# Patient Record
Sex: MALE | Race: BLACK OR AFRICAN AMERICAN | Employment: FULL TIME | URBAN - METROPOLITAN AREA
[De-identification: names, ages, dates, MRNs, and addresses within clinical notes are randomized per-mention and may not be internally consistent; named-entity substitution may affect disease eponyms.]

---

## 2021-03-10 ENCOUNTER — HOSPITAL ENCOUNTER (EMERGENCY)
Age: 46
Discharge: HOME | End: 2021-03-10
Attending: EMERGENCY MEDICINE
Payer: COMMERCIAL

## 2021-03-10 VITALS
TEMPERATURE: 98 F | SYSTOLIC BLOOD PRESSURE: 123 MMHG | WEIGHT: 185 LBS | DIASTOLIC BLOOD PRESSURE: 80 MMHG | RESPIRATION RATE: 16 BRPM | OXYGEN SATURATION: 100 % | BODY MASS INDEX: 25.9 KG/M2 | HEART RATE: 65 BPM | HEIGHT: 71 IN

## 2021-03-10 DIAGNOSIS — S50.859A: Primary | ICD-10-CM

## 2021-03-10 PROCEDURE — 90471 IMMUNIZATION ADMIN: CPT

## 2021-03-10 PROCEDURE — 90715 TDAP VACCINE 7 YRS/> IM: CPT | Performed by: EMERGENCY MEDICINE

## 2021-03-10 PROCEDURE — 99282 EMERGENCY DEPT VISIT SF MDM: CPT

## 2021-03-10 PROCEDURE — 75810000121 HC INCSN/RMVL FB ANY OTHER SITE

## 2021-03-10 PROCEDURE — 74011250636 HC RX REV CODE- 250/636: Performed by: EMERGENCY MEDICINE

## 2021-03-10 RX ORDER — LIDOCAINE HYDROCHLORIDE 10 MG/ML
10 INJECTION INFILTRATION; PERINEURAL ONCE
Status: DISCONTINUED | OUTPATIENT
Start: 2021-03-10 | End: 2021-03-10 | Stop reason: HOSPADM

## 2021-03-10 RX ADMIN — TETANUS TOXOID, REDUCED DIPHTHERIA TOXOID AND ACELLULAR PERTUSSIS VACCINE, ADSORBED 0.5 ML: 5; 2.5; 8; 8; 2.5 SUSPENSION INTRAMUSCULAR at 08:09

## 2021-03-10 NOTE — ED PROVIDER NOTES
EMERGENCY DEPARTMENT HISTORY AND PHYSICAL EXAM      Date: 3/10/2021  Patient Name: Mayo Newsome    History of Presenting Illness     Chief Complaint   Patient presents with    Foreign Body       History Provided By: Patient    HPI: Mayo Newsome, 39 y.o. male with a past medical history significant No significant past medical history presents to the ED with chief complaint of Foreign Body  . 72-year-old who is otherwise healthy. 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 They are able to pull the large piece out but a small piece broke and he thinks he still has a foreign body still in his arm. No concern of any metal.  No deformity. No pain medicine prior to arrival.  Occurred just prior to coming. Unsure about his tetanus immunizations. Isolated injury to the arm. It is a Workmen's Comp. There are no other complaints, changes, or physical findings at this time. PCP: None    Current Facility-Administered Medications   Medication Dose Route Frequency Provider Last Rate Last Admin    lidocaine (XYLOCAINE) 10 mg/mL (1 %) injection 10 mL  10 mL IntraDERMal ONCE Annabel Fernandez MD           Past History     Past Medical History:  Denies      Past Surgical History:  Denies      Family History:  Denies      Social History:[place]  Social History     Tobacco Use    Smoking status: Not on file   Substance Use Topics    Alcohol use: Not on file    Drug use: Not on file       Allergies:  No Known Allergies      Review of Systems   Review of Systems   Constitutional: Negative. Negative for chills, fatigue and fever. HENT: Negative. Negative for congestion, ear pain, nosebleeds and sore throat. Eyes: Negative. Negative for pain, discharge and visual disturbance. Respiratory: Negative. Negative for cough, chest tightness and shortness of breath. Cardiovascular: Negative. Negative for chest pain and leg swelling. Gastrointestinal: Negative. Negative for abdominal pain, blood in stool, constipation, diarrhea, nausea and vomiting. Endocrine: Negative. Genitourinary: Negative. Negative for difficulty urinating, dysuria and flank pain. Musculoskeletal: Negative. Negative for back pain and myalgias. Skin: Positive for wound. Negative for rash. Allergic/Immunologic: Negative. Neurological: Negative. Negative for dizziness, syncope, weakness, numbness and headaches. Hematological: Negative. Does not bruise/bleed easily. Psychiatric/Behavioral: Negative. Negative for agitation, confusion, hallucinations and suicidal ideas. All other systems reviewed and are negative. Physical Exam   Physical Exam  Vitals signs and nursing note reviewed. Constitutional:       General: He is not in acute distress. Appearance: He is normal weight. He is not ill-appearing. HENT:      Head: Normocephalic and atraumatic. Right Ear: External ear normal.      Left Ear: External ear normal.      Nose: Nose normal. No rhinorrhea. Mouth/Throat:      Mouth: Mucous membranes are moist.      Pharynx: Oropharynx is clear. Eyes:      Extraocular Movements: Extraocular movements intact. Conjunctiva/sclera: Conjunctivae normal.      Pupils: Pupils are equal, round, and reactive to light. Neck:      Musculoskeletal: Normal range of motion and neck supple. Cardiovascular:      Rate and Rhythm: Normal rate and regular rhythm. Pulses: Normal pulses. Heart sounds: Normal heart sounds. Pulmonary:      Effort: Pulmonary effort is normal. No respiratory distress. Breath sounds: Normal breath sounds. Abdominal:      General: Abdomen is flat. Bowel sounds are normal.      Palpations: Abdomen is soft. Musculoskeletal: Normal range of motion. General: No tenderness or deformity. Skin:     General: Skin is warm and dry. Capillary Refill: Capillary refill takes less than 2 seconds. Findings: No bruising, lesion or rash. Comments: Forearm wound that is not actively bleeding. Just adjacent to the wound there is a very firm area concern for a possible foreign body. Neurological:      General: No focal deficit present. Mental Status: He is alert and oriented to person, place, and time. Mental status is at baseline. Psychiatric:         Mood and Affect: Mood normal.         Behavior: Behavior normal.         Thought Content: Thought content normal.         Judgment: Judgment normal.         Diagnostic Study Results     Labs -   No results found for this or any previous visit (from the past 12 hour(s)). Radiologic Studies -   No orders to display     CT Results  (Last 48 hours)    None        CXR Results  (Last 48 hours)    None          Medical Decision Making and ED Course   I am the first provider for this patient. I reviewed the vital signs, available nursing notes, past medical history, past surgical history, family history and social history. Vital Signs-Reviewed the patient's vital signs. Patient Vitals for the past 12 hrs:   Temp Pulse Resp BP SpO2   03/10/21 0753 98 °F (36.7 °C) 65 16 123/80 100 %       EKG interpretation:         Records Reviewed: Previous Hospital chart. EMS run report      ED Course:   Initial assessment performed. The patients presenting problems have been discussed, and they are in agreement with the care plan formulated and outlined with them. I have encouraged them to ask questions as they arise throughout their visit. Orders Placed This Encounter    lidocaine (XYLOCAINE) 10 mg/mL (1 %) injection 10 mL    diph,Pertuss(AC),Tet Vac-PF (BOOSTRIX) suspension 0.5 mL              CONSULTANTS:  Consults      Provider Notes (Medical Decision Making):   33-year-old with a wood foreign body in his forearm. Tetanus updated. I&D performed and foreign body removed patient feels better. Workmen's Comp. for follow-up.       Procedures FOREIGN BODY REMOVAL:  Lidocaine 1% 2cc, foreign body removed, wood splinter, pt tolerated well. abx prescribed                Disposition       Emergency Department Disposition:  Discharged      Diagnosis     Clinical Impression:   1. Foreign body in forearm, unspecified laterality, initial encounter        Attestations:    Ericka Rayo MD    Please note that this dictation was completed with [employer], the computer voice recognition software. Quite often unanticipated grammatical, syntax, homophones, and other interpretive errors are inadvertently transcribed by the computer software. Please disregard these errors. Please excuse any errors that have escaped final proofreading. Thank you.

## 2021-03-10 NOTE — ED TRIAGE NOTES
[employer] pt stated that while at work a piece of a wood splinter flew into his right lower arm; bleeding under control

## 2021-03-10 NOTE — LETTER
Billie Adair was seen and treated in our emergency department on 3/10/2021. He may return to work on 3/12/2021 If you have any questions or concerns, please don't hesitate to call.  
 
 
Anaid Briones PA-C

## 2021-11-23 ENCOUNTER — HOSPITAL ENCOUNTER (EMERGENCY)
Age: 46
Discharge: HOME | End: 2021-11-23
Admitting: EMERGENCY MEDICINE
Payer: OTHER MISCELLANEOUS

## 2021-11-23 ENCOUNTER — APPOINTMENT (OUTPATIENT)
Dept: GENERAL RADIOLOGY | Age: 46
End: 2021-11-23
Attending: EMERGENCY MEDICINE
Payer: OTHER MISCELLANEOUS

## 2021-11-23 ENCOUNTER — APPOINTMENT (OUTPATIENT)
Dept: GENERAL RADIOLOGY | Age: 46
End: 2021-11-23
Attending: NURSE PRACTITIONER
Payer: OTHER MISCELLANEOUS

## 2021-11-23 VITALS
WEIGHT: 185 LBS | OXYGEN SATURATION: 99 % | BODY MASS INDEX: 25.9 KG/M2 | DIASTOLIC BLOOD PRESSURE: 79 MMHG | HEIGHT: 71 IN | TEMPERATURE: 98.4 F | RESPIRATION RATE: 18 BRPM | SYSTOLIC BLOOD PRESSURE: 121 MMHG | HEART RATE: 72 BPM

## 2021-11-23 DIAGNOSIS — S60.552A: Primary | ICD-10-CM

## 2021-11-23 PROCEDURE — 74011250636 HC RX REV CODE- 250/636: Performed by: NURSE PRACTITIONER

## 2021-11-23 PROCEDURE — 73130 X-RAY EXAM OF HAND: CPT

## 2021-11-23 PROCEDURE — 75810000121 HC INCSN/RMVL FB ANY OTHER SITE

## 2021-11-23 PROCEDURE — 99283 EMERGENCY DEPT VISIT LOW MDM: CPT

## 2021-11-23 PROCEDURE — 74011000250 HC RX REV CODE- 250: Performed by: NURSE PRACTITIONER

## 2021-11-23 PROCEDURE — 96375 TX/PRO/DX INJ NEW DRUG ADDON: CPT

## 2021-11-23 PROCEDURE — 96374 THER/PROPH/DIAG INJ IV PUSH: CPT

## 2021-11-23 RX ORDER — NAPROXEN 500 MG/1
500 TABLET ORAL 2 TIMES DAILY WITH MEALS
Qty: 30 TABLET | Refills: 0 | Status: SHIPPED | OUTPATIENT
Start: 2021-11-23

## 2021-11-23 RX ORDER — MORPHINE SULFATE 4 MG/ML
4 INJECTION INTRAVENOUS ONCE
Status: COMPLETED | OUTPATIENT
Start: 2021-11-23 | End: 2021-11-23

## 2021-11-23 RX ORDER — ONDANSETRON 2 MG/ML
4 INJECTION INTRAMUSCULAR; INTRAVENOUS
Status: COMPLETED | OUTPATIENT
Start: 2021-11-23 | End: 2021-11-23

## 2021-11-23 RX ORDER — LIDOCAINE HYDROCHLORIDE 10 MG/ML
20 INJECTION INFILTRATION; PERINEURAL ONCE
Status: COMPLETED | OUTPATIENT
Start: 2021-11-23 | End: 2021-11-23

## 2021-11-23 RX ORDER — CEPHALEXIN 500 MG/1
500 CAPSULE ORAL 2 TIMES DAILY
Qty: 20 CAPSULE | Refills: 0 | Status: SHIPPED | OUTPATIENT
Start: 2021-11-23 | End: 2021-12-03

## 2021-11-23 RX ADMIN — LIDOCAINE HYDROCHLORIDE 2 ML: 10 INJECTION, SOLUTION INFILTRATION; PERINEURAL at 21:31

## 2021-11-23 RX ADMIN — CEFAZOLIN SODIUM 1 G: 1 INJECTION, POWDER, FOR SOLUTION INTRAMUSCULAR; INTRAVENOUS at 21:33

## 2021-11-23 RX ADMIN — MORPHINE SULFATE 4 MG: 4 INJECTION INTRAVENOUS at 21:07

## 2021-11-23 RX ADMIN — ONDANSETRON 4 MG: 2 INJECTION INTRAMUSCULAR; INTRAVENOUS at 21:07

## 2021-11-23 NOTE — Clinical Note
[facility] EMERGENCY DEPT  [address] [place] [address] [place] [address]  [phone]    Work/School Note    Date: 11/23/2021    To Whom It May concern:    Dafne Kaminski was seen and treated today in the emergency room by the following provider(s):  Nurse Practitioner: Shania Russell NP. Dafne Kaminski is excused from work/school on 11/23/2021 through 11/25/2021. He is medically clear to return to work/school on 11/26/2021.          Sincerely,          Kerri Calvo NP

## 2021-11-23 NOTE — Clinical Note
[address] [facility] EMERGENCY DEPT  [facility] [place] [address]  [phone]    Work/School Note    Date: 11/23/2021    To Whom It May concern:    Emmanuel Cooley was seen and treated today in the emergency room by the following provider(s):  Nurse Practitioner: Ruth Ann Ordonez NP. Emmanuel Cooley is excused from work/school on 11/23/2021 through 11/25/2021. He is medically clear to return to work/school on 11/26/2021.          Sincerely,          Sridhar Guzman

## 2021-11-24 NOTE — DISCHARGE INSTRUCTIONS
Thank you! Thank you for allowing me to care for you in the emergency department. I sincerely hope that you are satisfied with your visit today. It is my goal to provide you with excellent care. Below you will find a list of your labs and imaging from your visit today. Should you have any questions regarding these results please do not hesitate to call the emergency department. Labs -   No results found for this or any previous visit (from the past 12 hour(s)). Radiologic Studies -   XR HAND LT MIN 3 V   Final Result   FINDINGS: IMPRESSION: 3 views of the left hand. The radiopaque nail has been   removed from the fifth metacarpal region. No evident fracture or acute bony   defect. No dislocation. XR HAND LT MIN 3 V   Final Result   FINDINGS: IMPRESSION: 3 views of the left hand. 8.5 cm nail extends through the   fifth metacarpal region without evident fracture. No dislocation. 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 CT Results  (Last 48 hours)      None          CXR Results  (Last 48 hours)      None               If you feel that you have not received excellent quality care or timely care, please ask to speak to the nurse manager. Please choose us in the future for your continued health care needs. ------------------------------------------------------------------------------------------------------------  The exam and treatment you received in the Emergency Department were for an urgent problem and are not intended as complete care. It is important that you follow-up with a doctor, nurse practitioner, or physician assistant to:  (1) confirm your diagnosis,  (2) re-evaluation of changes in your illness and treatment, and  (3) for ongoing care. If your symptoms become worse or you do not improve as expected and you are unable to reach your usual health care provider, you should return to the Emergency Department. We are available 24 hours a day. Please take your discharge instructions with you when you go to your follow-up appointment. If you have any problem arranging a follow-up appointment, contact the Emergency Department immediately. If a prescription has been provided, please have it filled as soon as possible to prevent a delay in treatment. Read the entire medication instruction sheet provided to you by the pharmacy. If you have any questions or reservations about taking the medication due to side effects or interactions with other medications, please call your primary care physician or contact the ER to speak with the charge nurse. Make an appointment with your family doctor or the physician you were referred to for follow-up of this visit as instructed on your discharge paperwork, as this is a mandatory follow-up. Return to the ER if you are unable to be seen or if you are unable to be seen in a timely manner. If you have any problem arranging the follow-up visit, contact the Emergency Department immediately.

## 2021-11-24 NOTE — ED TRIAGE NOTES
Patient was using a nail gun and got a nail in his left hand the nail entered the palm and is coming out of the top of the hand

## 2021-11-24 NOTE — ED PROVIDER NOTES
EMERGENCY DEPARTMENT HISTORY AND PHYSICAL EXAM      Date: 11/23/2021  Patient Name: Enmanuel Rasmussen    History of Presenting Illness     Chief Complaint   Patient presents with    Hand Injury       History Provided By: Patient    HPI: Enmanuel Rasmussen, 42 y.o. male with a past medical history significant No significant past medical history presents to the ED with cc of left hand injury. Patient states he was at work tonight when his nail gun accidentally went off into his left hand. He presents with a 3.5 inch straight nail impaled from palmar aspect, exiting dorsal aspect. Bleeding controlled. He denies any tingling/numbness or decreased sensation. He describes his pain as severe throbbing, 10/10 presently, aggravating factors include movement, alleviating factors none. He has not tried taking anything for his symptoms. Tdap <5yrs. There are no other complaints, changes, or physical findings at this time. PCP: None    No current facility-administered medications on file prior to encounter. No current outpatient medications on file prior to encounter. Past History     Past Medical History:  History reviewed. No pertinent past medical history. Past Surgical History:  History reviewed. No pertinent surgical history. Family History:  History reviewed. No pertinent family history. Social History:  Social History     Tobacco Use    Smoking status: Never Smoker    Smokeless tobacco: Never Used   Substance Use Topics    Alcohol use: Not Currently    Drug use: Not Currently       Allergies:  No Known Allergies      Review of Systems     Review of Systems   Musculoskeletal: Positive for arthralgias. Skin: Negative. Neurological: Negative for numbness. All other systems reviewed and are negative. Physical Exam     Physical Exam  Vitals and nursing note reviewed. Constitutional:       General: He is not in acute distress. Appearance: Normal appearance.    Eyes: Extraocular Movements: Extraocular movements intact. Conjunctiva/sclera: Conjunctivae normal.   Cardiovascular:      Rate and Rhythm: Normal rate and regular rhythm. Pulses:           Radial pulses are 2+ on the right side. Heart sounds: Normal heart sounds. Pulmonary:      Effort: Pulmonary effort is normal.      Breath sounds: Normal breath sounds. No wheezing or rales. Skin:     General: Skin is warm and dry. Capillary Refill: Capillary refill takes less than 2 seconds. Findings: Wound present. Comments: 3.5 inch straight nail impaled from palmar aspect left hand, between fourth and fifth metacarpal exiting dorsal aspect just below MCP joint, ROM limited - painful to flexion, neurovascularly intact   Neurological:      General: No focal deficit present. Mental Status: He is alert. Lab and Diagnostic Study Results     Labs -   No results found for this or any previous visit (from the past 12 hour(s)). Radiologic Studies -   .[facility]    XR Results (most recent):  Results from Hospital Encounter encounter on 11/23/21    XR HAND LT MIN 3 V    Narrative  Post nail removal.    Comparison left hand x-ray 11/23/2021 at 2057 hours. Impression  FINDINGS: IMPRESSION: 3 views of the left hand. The radiopaque nail has been  removed from the fifth metacarpal region. No evident fracture or acute bony  defect. No dislocation. Medical Decision Making   - I am the first provider for this patient. - I reviewed the vital signs, available nursing notes, past medical history, past surgical history, family history and social history. - Initial assessment performed. The patients presenting problems have been discussed, and they are in agreement with the care plan formulated and outlined with them. I have encouraged them to ask questions as they arise throughout their visit. Vital Signs-Reviewed the patient's vital signs.   Patient Vitals for the past 12 hrs:   Temp Pulse Resp BP SpO2   11/23/21 2223 98.4 °F (36.9 °C) 72 18 121/79 99 %   11/23/21 2131  74 18 120/85 98 %   11/23/21 2039 98.4 °F (36.9 °C) 86 16 136/86 99 %       Records Reviewed: Nursing Notes    The patient presents with hand injury with a differential diagnosis of puncture wound, compound fracture, tendon/ligament injury      ED Course:   Patient presents with impaled nail left hand. X-ray negative for fracture. Neurovascularly intact. Nail removed without difficulty by attending, Dr. Aime Moreno. He was premedicated with pain medication and local anesthetic. IV Ancef given prior to discharge and bulky dressing with sling applied. Tdap UTD. Rx Keflex and naproxen given. Reinforced importance of hand elevation and keeping wound clean and dry with PCP follow-up for wound recheck in 2-3 days. Patient advised to return to the ED for wound recheck should he not be able to obtain an appointment. Orthopedic hand surgeon follow-up 1 week. Discussed worrisome reasons to return to the department sooner.      ED Course as of 11/23/21 2249 Tue Nov 23, 2021 2127 Nail removed by Dr. Miriam Max at bedside [LP]   2217 Discussed results and plan of care with patient [LP]      ED Course User Index  [LP] Liane Cushing, NP       Provider Notes (Medical Decision Making):     MDM  Number of Diagnoses or Management Options  Penetrating foreign body of skin of left hand, initial encounter: minor     Amount and/or Complexity of Data Reviewed  Tests in the radiology section of CPT®: ordered and reviewed  Discuss the patient with other providers: yes  Independent visualization of images, tracings, or specimens: yes    Risk of Complications, Morbidity, and/or Mortality  Presenting problems: moderate  Diagnostic procedures: moderate  Management options: moderate    Patient Progress  Patient progress: resolved         Procedures   Medical Decision Makingedical Decision Making  Performed by: Kandis Chairez MD  PROCEDURES:  Foreign Body Removal    Date/Time: 11/23/2021 9:46 PM  Performed by: Sidra Levi MD  Authorized by: Sidra Levi MD     Consent:     Consent obtained:  Verbal    Consent given by:  Patient    Risks discussed:  Bleeding, infection, pain, poor cosmetic result, nerve damage and incomplete removal    Alternatives discussed:  Referral  Location:     Location:  Hand    Hand location:  L palm    Depth: Intramuscular    Tendon involvement:  Superficial  Pre-procedure details:     Imaging:  X-ray    Preparation: Patient was prepped and draped in usual sterile fashion    Anesthesia (see MAR for exact dosages): Anesthesia method:  Local infiltration    Local anesthetic:  Lidocaine 1% w/o epi  Procedure type:     Procedure complexity:  Complex  Procedure details:     Incision length:  About 10 cc 1% lidocaine was injected in the area and the nail was pulled with forceps and pliers    Localization method:  Finder needle    Dissection of underlying tissues: no      Bloodless field: yes      Removal mechanism: Forceps    Guidance: serial x-rays      Foreign bodies recovered:  1    Intact foreign body removal: yes    Post-procedure details:     Neurovascular status: intact      Confirmation:  Complete removal verified with imaging    Skin closure:  None    Dressing:  Open (no dressing)    Patient tolerance of procedure: Tolerated well, no immediate complications  Comments:      Procedure done by me  DR Kannan Laswon               Disposition   Disposition: Condition stable and improved  DC-The patient was given verbal follow-up and wound care  instructions  DC- Pain Control DC Home plan: Nonsteroidals, Tylenol and Referral Family Medicine/PCP and Orthopedics    Discharged    DISCHARGE PLAN:  1. There are no discharge medications for this patient.     2.   Follow-up Information     Follow up With Specialties Details Why Blake Zhou MD Orthopedic Surgery Schedule an appointment as soon as possible for a visit in 1 week hand orthopedic, for ER follow up [address] [id number]  Uriel Meeks4  Schedule an appointment as soon as possible for a visit in 2 days or your PCP, For wound re-check [address] [place] [address]  [phone]        3. Return to ED if worse   4. Discharge Medication List as of 11/23/2021 10:28 PM      START taking these medications    Details   cephALEXin (Keflex) 500 mg capsule Take 1 Capsule by mouth two (2) times a day for 10 days. , Normal, Disp-20 Capsule, R-0      naproxen (NAPROSYN) 500 mg tablet Take 1 Tablet by mouth two (2) times daily (with meals). , Normal, Disp-30 Tablet, R-0           11:01 AM  I have personally seen and examined the patient, I have reviewed and agree with the MLP's findings, including all diagnostic interpretations, and plans as written. I was present during the key portions of separately billed procedures. Procedure of removing fb was done by me  Connor Hung MD          Diagnosis     Clinical Impression:   1. Penetrating foreign body of skin of left hand, initial encounter        Attestations:    Harika Norman NP    Please note that this dictation was completed with [employer], the computer voice recognition software. Quite often unanticipated grammatical, syntax, homophones, and other interpretive errors are inadvertently transcribed by the computer software. Please disregard these errors. Please excuse any errors that have escaped final proofreading. Thank you.

## 2023-05-15 RX ORDER — NAPROXEN 500 MG/1
500 TABLET ORAL 2 TIMES DAILY WITH MEALS
COMMUNITY
Start: 2021-11-23